# Patient Record
Sex: MALE | Race: BLACK OR AFRICAN AMERICAN | NOT HISPANIC OR LATINO | URBAN - METROPOLITAN AREA
[De-identification: names, ages, dates, MRNs, and addresses within clinical notes are randomized per-mention and may not be internally consistent; named-entity substitution may affect disease eponyms.]

---

## 2023-07-28 ENCOUNTER — EMERGENCY (EMERGENCY)
Facility: HOSPITAL | Age: 33
LOS: 1 days | Discharge: HOME | End: 2023-07-28
Attending: EMERGENCY MEDICINE | Admitting: EMERGENCY MEDICINE
Payer: MEDICAID

## 2023-07-28 VITALS
OXYGEN SATURATION: 98 % | HEART RATE: 69 BPM | SYSTOLIC BLOOD PRESSURE: 160 MMHG | DIASTOLIC BLOOD PRESSURE: 70 MMHG | HEIGHT: 71 IN | WEIGHT: 220.02 LBS | TEMPERATURE: 99 F | RESPIRATION RATE: 16 BRPM

## 2023-07-28 VITALS
DIASTOLIC BLOOD PRESSURE: 78 MMHG | TEMPERATURE: 97 F | RESPIRATION RATE: 16 BRPM | OXYGEN SATURATION: 100 % | HEART RATE: 66 BPM | SYSTOLIC BLOOD PRESSURE: 122 MMHG

## 2023-07-28 LAB
ALBUMIN SERPL ELPH-MCNC: 3.8 G/DL — SIGNIFICANT CHANGE UP (ref 3.3–5)
ALP SERPL-CCNC: 72 U/L — SIGNIFICANT CHANGE UP (ref 30–120)
ALT FLD-CCNC: 26 U/L DA — SIGNIFICANT CHANGE UP (ref 10–60)
ANION GAP SERPL CALC-SCNC: 5 MMOL/L — SIGNIFICANT CHANGE UP (ref 5–17)
AST SERPL-CCNC: 35 U/L — SIGNIFICANT CHANGE UP (ref 10–40)
BASOPHILS # BLD AUTO: 0.02 K/UL — SIGNIFICANT CHANGE UP (ref 0–0.2)
BASOPHILS NFR BLD AUTO: 0.5 % — SIGNIFICANT CHANGE UP (ref 0–2)
BILIRUB SERPL-MCNC: 0.7 MG/DL — SIGNIFICANT CHANGE UP (ref 0.2–1.2)
BUN SERPL-MCNC: 12 MG/DL — SIGNIFICANT CHANGE UP (ref 7–23)
CALCIUM SERPL-MCNC: 8.7 MG/DL — SIGNIFICANT CHANGE UP (ref 8.4–10.5)
CHLORIDE SERPL-SCNC: 101 MMOL/L — SIGNIFICANT CHANGE UP (ref 96–108)
CK MB BLD-MCNC: 0.3 % — SIGNIFICANT CHANGE UP (ref 0–3.5)
CK MB CFR SERPL CALC: 0.9 NG/ML — SIGNIFICANT CHANGE UP (ref 0–3.6)
CK SERPL-CCNC: 299 U/L — SIGNIFICANT CHANGE UP (ref 39–308)
CO2 SERPL-SCNC: 33 MMOL/L — HIGH (ref 22–31)
CREAT SERPL-MCNC: 0.93 MG/DL — SIGNIFICANT CHANGE UP (ref 0.5–1.3)
D DIMER BLD IA.RAPID-MCNC: <150 NG/ML DDU — SIGNIFICANT CHANGE UP
EGFR: 110 ML/MIN/1.73M2 — SIGNIFICANT CHANGE UP
EOSINOPHIL # BLD AUTO: 0.11 K/UL — SIGNIFICANT CHANGE UP (ref 0–0.5)
EOSINOPHIL NFR BLD AUTO: 2.5 % — SIGNIFICANT CHANGE UP (ref 0–6)
GLUCOSE SERPL-MCNC: 98 MG/DL — SIGNIFICANT CHANGE UP (ref 70–99)
HCT VFR BLD CALC: 42.8 % — SIGNIFICANT CHANGE UP (ref 39–50)
HGB BLD-MCNC: 12.5 G/DL — LOW (ref 13–17)
IMM GRANULOCYTES NFR BLD AUTO: 0.2 % — SIGNIFICANT CHANGE UP (ref 0–0.9)
LIDOCAIN IGE QN: 37 U/L — LOW (ref 73–393)
LYMPHOCYTES # BLD AUTO: 1.38 K/UL — SIGNIFICANT CHANGE UP (ref 1–3.3)
LYMPHOCYTES # BLD AUTO: 31.2 % — SIGNIFICANT CHANGE UP (ref 13–44)
MAGNESIUM SERPL-MCNC: 2.6 MG/DL — SIGNIFICANT CHANGE UP (ref 1.6–2.6)
MCHC RBC-ENTMCNC: 23.1 PG — LOW (ref 27–34)
MCHC RBC-ENTMCNC: 29.2 GM/DL — LOW (ref 32–36)
MCV RBC AUTO: 79.3 FL — LOW (ref 80–100)
MONOCYTES # BLD AUTO: 0.37 K/UL — SIGNIFICANT CHANGE UP (ref 0–0.9)
MONOCYTES NFR BLD AUTO: 8.4 % — SIGNIFICANT CHANGE UP (ref 2–14)
NEUTROPHILS # BLD AUTO: 2.54 K/UL — SIGNIFICANT CHANGE UP (ref 1.8–7.4)
NEUTROPHILS NFR BLD AUTO: 57.2 % — SIGNIFICANT CHANGE UP (ref 43–77)
NRBC # BLD: 0 /100 WBCS — SIGNIFICANT CHANGE UP (ref 0–0)
PLATELET # BLD AUTO: 243 K/UL — SIGNIFICANT CHANGE UP (ref 150–400)
POTASSIUM SERPL-MCNC: 4.7 MMOL/L — SIGNIFICANT CHANGE UP (ref 3.5–5.3)
POTASSIUM SERPL-SCNC: 4.7 MMOL/L — SIGNIFICANT CHANGE UP (ref 3.5–5.3)
PROT SERPL-MCNC: 7.6 G/DL — SIGNIFICANT CHANGE UP (ref 6–8.3)
RAPID RVP RESULT: SIGNIFICANT CHANGE UP
RBC # BLD: 5.4 M/UL — SIGNIFICANT CHANGE UP (ref 4.2–5.8)
RBC # FLD: 14 % — SIGNIFICANT CHANGE UP (ref 10.3–14.5)
SARS-COV-2 RNA SPEC QL NAA+PROBE: SIGNIFICANT CHANGE UP
SODIUM SERPL-SCNC: 139 MMOL/L — SIGNIFICANT CHANGE UP (ref 135–145)
TROPONIN I, HIGH SENSITIVITY RESULT: 4.1 NG/L — SIGNIFICANT CHANGE UP
WBC # BLD: 4.43 K/UL — SIGNIFICANT CHANGE UP (ref 3.8–10.5)
WBC # FLD AUTO: 4.43 K/UL — SIGNIFICANT CHANGE UP (ref 3.8–10.5)

## 2023-07-28 PROCEDURE — 93005 ELECTROCARDIOGRAM TRACING: CPT

## 2023-07-28 PROCEDURE — 70450 CT HEAD/BRAIN W/O DYE: CPT | Mod: 26,MA

## 2023-07-28 PROCEDURE — 84484 ASSAY OF TROPONIN QUANT: CPT

## 2023-07-28 PROCEDURE — 85379 FIBRIN DEGRADATION QUANT: CPT

## 2023-07-28 PROCEDURE — 93010 ELECTROCARDIOGRAM REPORT: CPT

## 2023-07-28 PROCEDURE — 99285 EMERGENCY DEPT VISIT HI MDM: CPT

## 2023-07-28 PROCEDURE — 99285 EMERGENCY DEPT VISIT HI MDM: CPT | Mod: 25

## 2023-07-28 PROCEDURE — 80053 COMPREHEN METABOLIC PANEL: CPT

## 2023-07-28 PROCEDURE — 82550 ASSAY OF CK (CPK): CPT

## 2023-07-28 PROCEDURE — 0225U NFCT DS DNA&RNA 21 SARSCOV2: CPT

## 2023-07-28 PROCEDURE — 94640 AIRWAY INHALATION TREATMENT: CPT

## 2023-07-28 PROCEDURE — 36415 COLL VENOUS BLD VENIPUNCTURE: CPT

## 2023-07-28 PROCEDURE — 71045 X-RAY EXAM CHEST 1 VIEW: CPT | Mod: 26

## 2023-07-28 PROCEDURE — 82553 CREATINE MB FRACTION: CPT

## 2023-07-28 PROCEDURE — 71045 X-RAY EXAM CHEST 1 VIEW: CPT

## 2023-07-28 PROCEDURE — 96360 HYDRATION IV INFUSION INIT: CPT

## 2023-07-28 PROCEDURE — 83690 ASSAY OF LIPASE: CPT

## 2023-07-28 PROCEDURE — 70450 CT HEAD/BRAIN W/O DYE: CPT | Mod: MA

## 2023-07-28 PROCEDURE — 85025 COMPLETE CBC W/AUTO DIFF WBC: CPT

## 2023-07-28 PROCEDURE — 83735 ASSAY OF MAGNESIUM: CPT

## 2023-07-28 RX ORDER — ALBUTEROL 90 UG/1
2 AEROSOL, METERED ORAL
Qty: 1 | Refills: 0
Start: 2023-07-28 | End: 2023-08-01

## 2023-07-28 RX ORDER — BUPRENORPHINE AND NALOXONE 2; .5 MG/1; MG/1
2 TABLET SUBLINGUAL
Refills: 0 | DISCHARGE

## 2023-07-28 RX ORDER — SODIUM CHLORIDE 9 MG/ML
1000 INJECTION INTRAMUSCULAR; INTRAVENOUS; SUBCUTANEOUS ONCE
Refills: 0 | Status: COMPLETED | OUTPATIENT
Start: 2023-07-28 | End: 2023-07-28

## 2023-07-28 RX ORDER — AZITHROMYCIN 500 MG/1
1 TABLET, FILM COATED ORAL
Qty: 3 | Refills: 0
Start: 2023-07-28 | End: 2023-07-30

## 2023-07-28 RX ORDER — IPRATROPIUM/ALBUTEROL SULFATE 18-103MCG
3 AEROSOL WITH ADAPTER (GRAM) INHALATION ONCE
Refills: 0 | Status: COMPLETED | OUTPATIENT
Start: 2023-07-28 | End: 2023-07-28

## 2023-07-28 RX ORDER — AZITHROMYCIN 500 MG/1
1 TABLET, FILM COATED ORAL
Refills: 0
Start: 2023-07-28 | End: 2023-07-30

## 2023-07-28 RX ADMIN — SODIUM CHLORIDE 1000 MILLILITER(S): 9 INJECTION INTRAMUSCULAR; INTRAVENOUS; SUBCUTANEOUS at 11:00

## 2023-07-28 RX ADMIN — SODIUM CHLORIDE 1000 MILLILITER(S): 9 INJECTION INTRAMUSCULAR; INTRAVENOUS; SUBCUTANEOUS at 10:00

## 2023-07-28 RX ADMIN — Medication 3 MILLILITER(S): at 10:02

## 2023-07-28 NOTE — ED ADULT NURSE NOTE - OBJECTIVE STATEMENT
Complaining of feeling short of breath and lightheaded this am while at work today, denies cough, congestion, runny nose or fever. Pt states he also gets blurry vision on and off, denies syncopal episode, denies chest pain.  Pt denies pain or any discomfort. WATKINS without difficulty. Gait steady. PERRL. Color good,  Skin warm and dry. Pt on Suboxone for Hx Opiates Addiction.

## 2023-07-28 NOTE — ED PROVIDER NOTE - OBJECTIVE STATEMENT
Patient is a 33-year-old male who presents to the emergency room with multiple medical issues.  Past medical history of opiate abuse on Suboxone.  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  Denies any headache.  Denies any fevers chills chest pain nausea vomiting abdominal pain extremity numbness or weakness.  No prior similar episodes.  Eat and drink normal today.  Denies any recent long car rides or trips.  No calf pain or swelling.  Denies any prior similar episodes.  He reports it almost felt like he was going to pass out but denies actual loss of consciousness.

## 2023-07-28 NOTE — ED PROVIDER NOTE - NSFOLLOWUPINSTRUCTIONS_ED_ALL_ED_FT
Return to the ED for any new or worsening symptoms  Take your medication as prescribed  Pro Air inhaler 2 puffs every 4-6 hours as needed for shortness of breath   Zithromax 1 tab daily   Prednisone 1 tab daily   Make sure to remain hydrated  Follow up with your pulmonary physician call tomorrow to schedule follow up   Advance activity as tolerated      Shortness of Breath, Adult  Shortness of breath is when a person has trouble breathing or when a person feels like she or he is having trouble breathing in enough air. Shortness of breath could be a sign of a medical problem.    Follow these instructions at home:  A sign showing that a person should not smoke.  Pollutants    Do not use any products that contain nicotine or tobacco. These products include cigarettes, chewing tobacco, and vaping devices, such as e-cigarettes. This also includes cigars and pipes. If you need help quitting, ask your health care provider.  Avoid things that can irritate your airways, including:  Smoke. This includes campfire smoke, forest fire smoke, and secondhand smoke from tobacco products. Do not smoke or allow others to smoke in your home.  Mold.  Dust.  Air pollution.  Chemical fumes.  Things that can give you an allergic reaction (allergens) if you have allergies. Common allergens include pollen from grasses or trees and animal dander.  Keep your living space clean and free of mold and dust.  General instructions    Pay attention to any changes in your symptoms.  Take over-the-counter and prescription medicines only as told by your health care provider. This includes oxygen therapy and inhaled medicines.  Rest as needed.  Return to your normal activities as told by your health care provider. Ask your health care provider what activities are safe for you.  Keep all follow-up visits. This is important.  Contact a health care provider if:  Your condition does not improve as soon as expected.  You have a hard time doing your normal activities, even after you rest.  You have new symptoms.  You cannot walk up stairs or exercise the way that you normally do.  Get help right away if:  Your shortness of breath gets worse.  You have shortness of breath when you are resting.  You feel light-headed or you faint.  You have a cough that is not controlled with medicines.  You cough up blood.  You have pain with breathing.  You have pain in your chest, arms, shoulders, or abdomen.  You have a fever.  These symptoms may be an emergency. Get help right away. Call 911.  Do not wait to see if the symptoms will go away.  Do not drive yourself to the hospital.  Summary  Shortness of breath is when a person has trouble breathing enough air. It can be a sign of a medical problem.  Avoid things that irritate your lungs, such as smoking, pollution, mold, and dust.  Pay attention to changes in your symptoms and contact your health care provider if you have a hard time completing daily activities because of shortness of breath.  This information is not intended to replace advice given to you by your health care provider. Make sure you discuss any questions you have with your health care provider.

## 2023-07-28 NOTE — ED PROVIDER NOTE - CLINICAL SUMMARY MEDICAL DECISION MAKING FREE TEXT BOX
Patient is a 33-year-old male who presents to the emergency room with multiple medical issues.  Past medical history of opiate abuse on Suboxone.  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  Denies any headache.  Denies any fevers chills chest pain nausea vomiting abdominal pain extremity numbness or weakness.  No prior similar episodes.  Eat and drink normal today.  Denies any recent long car rides or trips.  No calf pain or swelling.  Denies any prior similar episodes.  He reports it almost felt like he was going to pass out but denies actual loss of consciousness. Patient presenting to the emergency room with multiple medical complaints for lightheadedness may be dehydration versus electrolyte abnormality versus possible infectious cause versus cardiac pathology.  Will obtain screening labs cardiac enzymes D-dimer EKG hydrate check orthostatics and obtain CT imaging to rule out a neurologic issue.  Patient's current NIH stroke scale is 0 therefore would not be considered a tenecteplase candidate.  For shortness of breath patient is noted to be wheezing we will treat with DuoNeb obtain chest x-ray review labs and obtain RVP. Independent review of EKG reveals a normal sinus rhythm at 66 bpm Patient is a 33-year-old male who presents to the emergency room with multiple medical issues.  Past medical history of opiate abuse on Suboxone.  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  Denies any headache.  Denies any fevers chills chest pain nausea vomiting abdominal pain extremity numbness or weakness.  No prior similar episodes.  Eat and drink normal today.  Denies any recent long car rides or trips.  No calf pain or swelling.  Denies any prior similar episodes.  He reports it almost felt like he was going to pass out but denies actual loss of consciousness. Patient presenting to the emergency room with multiple medical complaints for lightheadedness may be dehydration versus electrolyte abnormality versus possible infectious cause versus cardiac pathology.  Will obtain screening labs cardiac enzymes D-dimer EKG hydrate check orthostatics and obtain CT imaging to rule out a neurologic issue.  Patient's current NIH stroke scale is 0 therefore would not be considered a tenecteplase candidate.  For shortness of breath patient is noted to be wheezing we will treat with DuoNeb obtain chest x-ray review labs and obtain RVP. Independent review of EKG reveals a normal sinus rhythm at 66 bpm. Patient resting comfortably with resolution of all symptoms at this time.  Normal white count D-dimer negative no significant electrolyte abnormality cardiac markers negative CT imaging of the brain is unremarkable. Independent review of chest x-ray is negative for acute infiltrate.  Shortness of breath and wheezing resolved after nebulizer treatment.  Likely a viral bronchitis but will cover with p.o. antibiotics.  At this time denies any lightheadedness shortness of breath or blurred vision.  Continues to deny chest pain.  Stable for discharge home with outpatient follow-up.

## 2023-07-28 NOTE — ED PROVIDER NOTE - CARE PROVIDER_API CALL
Joselo Meneses  Pulmonary Disease  [address] [place] [address]  Phone: [phone]  Fax: [phone]  Follow Up Time:

## 2023-07-28 NOTE — ED PROVIDER NOTE - DIFFERENTIAL DIAGNOSIS
Patient presenting to the emergency room with multiple medical complaints for lightheadedness may be dehydration versus electrolyte abnormality versus possible infectious cause versus cardiac pathology.  Will obtain screening labs cardiac enzymes D-dimer EKG hydrate check orthostatics and obtain CT imaging to rule out a neurologic issue.  Patient's current NIH stroke scale is 0 therefore would not be considered a tenecteplase candidate.  For shortness of breath patient is noted to be wheezing we will treat with DuoNeb obtain chest x-ray review labs and obtain RVP. Differential Diagnosis

## 2023-07-28 NOTE — ED PROVIDER NOTE - PATIENT PORTAL LINK FT
You can access the FollowMyHealth Patient Portal offered by [facility] by registering at the following website: [email or web address]. By joining "[employer]’s FollowMyHealth portal, you will also be able to view your health information using other applications (apps) compatible with our system.